# Patient Record
Sex: MALE | Race: WHITE
[De-identification: names, ages, dates, MRNs, and addresses within clinical notes are randomized per-mention and may not be internally consistent; named-entity substitution may affect disease eponyms.]

---

## 2019-03-01 ENCOUNTER — HOSPITAL ENCOUNTER (EMERGENCY)
Dept: HOSPITAL 46 - ED | Age: 19
Discharge: LEFT BEFORE DISCHARGE | End: 2019-03-01
Payer: MEDICAID

## 2019-03-01 VITALS — HEIGHT: 78 IN | WEIGHT: 315 LBS | BODY MASS INDEX: 36.45 KG/M2

## 2019-03-01 DIAGNOSIS — J45.901: ICD-10-CM

## 2019-03-01 DIAGNOSIS — R09.02: ICD-10-CM

## 2019-03-01 DIAGNOSIS — Z88.2: ICD-10-CM

## 2019-03-01 DIAGNOSIS — A41.9: Primary | ICD-10-CM

## 2019-03-01 DIAGNOSIS — Z79.899: ICD-10-CM

## 2019-03-01 DIAGNOSIS — Z88.0: ICD-10-CM

## 2019-03-02 NOTE — EKG
[facility]
                                    [address]
                                  [place], [place]  [address]
_________________________________________________________________________________________
                                                                 Signed   
 
 
Sinus tachycardia
Possible Left atrial enlargement
Possible Inferior infarct , age undetermined
Abnormal ECG
No previous ECGs available
Confirmed by GEOVANY OH MD ([id number]) on 3/2/2019 6:34:40 AM
 
 
 
 
 
 
 
 
 
 
 
 
 
 
 
 
 
 
 
 
 
 
 
 
 
 
 
 
 
 
 
 
 
 
 
 
 
 
 
    Electronically Signed By: GEOVANY OH MD  03/02/19 0634
_________________________________________________________________________________________
PATIENT NAME:     BRODERICK TRIPLETT DARIUS                
MEDICAL RECORD #: [id number]                     Electrocardiogram             
          ACCT #: [id number]  
DATE OF BIRTH:   [birth year]                                       
PHYSICIAN:   GEOVANY OH MD                   REPORT #: [id number]
REPORT IS CONFIDENTIAL AND NOT TO BE RELEASED WITHOUT AUTHORIZATION